# Patient Record
(demographics unavailable — no encounter records)

---

## 2025-06-02 NOTE — PHYSICAL EXAM
[Healthy Appearing] : healthy appearing [Well Nourished] : well nourished [Interactive] : interactive [Normal Appearance] : normal appearance [Well formed] : well formed [Normally Set] : normally set [Normal S1 and S2] : normal S1 and S2 [Clear to Ausculation Bilaterally] : clear to auscultation bilaterally [Abdomen Soft] : soft [Abdomen Tenderness] : non-tender [] : no hepatosplenomegaly [Normal] : normal  [1] : was Jonathan stage 1 [Normal for Age] : was normal for age [___] : [unfilled] [Murmur] : no murmurs [FreeTextEntry1] : None [FreeTextEntry2] : Penile length of 3.5 cm

## 2025-06-02 NOTE — PAST MEDICAL HISTORY
[At Term] : at term [Normal Vaginal Route] : by normal vaginal route [None] : there were no delivery complications [FreeTextEntry1] : 6lb 7oz;

## 2025-06-02 NOTE — CONSULT LETTER
[Dear  ___] : Dear  [unfilled], [Consult Letter:] : I had the pleasure of evaluating your patient, [unfilled]. [Please see my note below.] : Please see my note below. [Consult Closing:] : Thank you very much for allowing me to participate in the care of this patient.  If you have any questions, please do not hesitate to contact me. [Sincerely,] : Sincerely, [FreeTextEntry3] : Kumar Mclain M.D., FAAP. Professor of Pediatrics, [facility] [employer] at \[facility]\""/[facility] Chief of Endocrinology, [facility] Director, [facility] Diabetes [place] [address] Tel: [phone]; Fax: [phone]; Email: [email or web address] <[email or web address]

## 2025-06-02 NOTE — DISCUSSION/SUMMARY
[FreeTextEntry1] : This 8-year-old boy presented with concerns regarding short stature His growth chart review shows that he is growing at the 5th %ile for height toward a projected height of 65 inches His father says that he is growing much shorter than his older brothers His mother is a late deandre He may have constitutional delay of growth and puberty PLAN: 1.  Short stature screening lab tests 2. Bone age x-ray for assessment of the degree of skeletal maturation 3.  Monitor auxology 4. May recommend GHST if there's evidence for growth deceleration We ordered the labs shown in the section on Plan He is scheduled for a follow-up visit in 6 months His parent was satisfied with the explanation and the conduct of the visit.

## 2025-06-02 NOTE — CONSULT LETTER
[Dear  ___] : Dear  [unfilled], [Consult Letter:] : I had the pleasure of evaluating your patient, [unfilled]. [Please see my note below.] : Please see my note below. [Consult Closing:] : Thank you very much for allowing me to participate in the care of this patient.  If you have any questions, please do not hesitate to contact me. [Sincerely,] : Sincerely, [FreeTextEntry3] : Kumar Mclain M.D., FAAP. Professor of Pediatrics, [facility] [employer] at \[facility]\""/[facility] Chief of Endocrinology, [facility] Director, [facility] Diabetes [place] [address] Tel: [phone]; Fax: [phone]; Email: [email or web address]>

## 2025-06-02 NOTE — HISTORY OF PRESENT ILLNESS
[Takes medication as prescribed] : takes
[FreeTextEntry2] : Dear Dr. BERNICE CHOI I saw your patient in the Pediatric Endocrine clinic at the [facility] This 8 year-old boy was referred for evaluation for short stature His growth chart review shows that he is growing at the 5th %ile for height toward a projected height of 65 inches His father says that he is growing much shorter than his older brothers His mother is a late deandre His past medical history is remarkable for a normal state of health        
[FreeTextEntry2] : Dear Dr. BERNICE CHOI I saw your patient in the Pediatric Endocrine clinic at the [facility] This 8 year-old boy was referred for evaluation for short stature His growth chart review shows that he is growing at the 5th %ile for height toward a projected height of 65 inches His father says that he is growing much shorter than his older brothers His mother is a late deandre His past medical history is remarkable for a normal state of health